# Patient Record
Sex: MALE | Race: WHITE | NOT HISPANIC OR LATINO | URBAN - METROPOLITAN AREA
[De-identification: names, ages, dates, MRNs, and addresses within clinical notes are randomized per-mention and may not be internally consistent; named-entity substitution may affect disease eponyms.]

---

## 2020-04-23 ENCOUNTER — TELEHEALTH PROVIDED OTHER THAN IN PATIENT'S HOME (OUTPATIENT)
Dept: URBAN - METROPOLITAN AREA CLINIC 34 | Facility: CLINIC | Age: 40
End: 2020-04-23

## 2020-04-23 VITALS — WEIGHT: 196 LBS | HEIGHT: 70 IN

## 2020-04-23 DIAGNOSIS — R14.2 ERUCTATION: ICD-10-CM

## 2020-04-23 DIAGNOSIS — R10.10 UPPER ABDOMINAL PAIN, UNSPECIFIED: ICD-10-CM

## 2020-04-23 PROCEDURE — 99244 OFF/OP CNSLTJ NEW/EST MOD 40: CPT | Mod: 95

## 2020-04-23 RX ORDER — PANTOPRAZOLE SODIUM 40 MG/1
TABLET, DELAYED RELEASE ORAL
Qty: 30 | Refills: 5 | Status: COMPLETED
Start: 2020-04-23 | End: 2020-05-06

## 2020-04-23 NOTE — SERVICEHPINOTES
PATIENT VERIFIED BY DATE OF BIRTH AND NAME. Patient has been consented for this telecommunication visit. Vipul is in the [employer] and was [place]. Symptoms began in November while there and has continued since. States his symptoms started after eating local food. When symptoms first began was having abd distention and a lot of belching. Never had heartburn.  Had darker stool twice in December but none since. Since then, after eating acidic or greasy foods, eats bloated and a lot of belching.  Was told to try omeprazole and pepto bismol which caused stomach pain when he first started. Takes Carafate a few times per day since beginning of March, "maybe" it helps. Took lansoprazole x 3 weeks but stopped as it caused anxiety but did help. Felt dizzy with omeprazole. Has Pepcid and has taken prn. had h pylori serology which was reportedly negative. Denies dysphagia. Occasional nausea no vomiting. Has been avoiding NSAIDs since sx began--prior to sx onset was taking ibuprofen daily for a few weeks for hamstring tear (was taking 1600mg per day). Now takes Tylenol prn.  Avoiding alcohol nonsmoker. Drinking 1 cup of coffee daily. Caffeine does not seem to bother him. No family hx of GI cancer. Appetite is good. no weight loss.BRROS per HPI, otherwise negative. BR

## 2020-05-06 ENCOUNTER — OFFICE (OUTPATIENT)
Dept: URBAN - METROPOLITAN AREA PATHOLOGY 18 | Facility: PATHOLOGY | Age: 40
End: 2020-05-06
Payer: COMMERCIAL

## 2020-05-06 ENCOUNTER — OFFICE (OUTPATIENT)
Dept: URBAN - METROPOLITAN AREA CLINIC 30 | Facility: CLINIC | Age: 40
End: 2020-05-06

## 2020-05-06 VITALS
DIASTOLIC BLOOD PRESSURE: 69 MMHG | HEART RATE: 72 BPM | HEART RATE: 74 BPM | HEART RATE: 66 BPM | DIASTOLIC BLOOD PRESSURE: 68 MMHG | WEIGHT: 196 LBS | OXYGEN SATURATION: 97 % | SYSTOLIC BLOOD PRESSURE: 119 MMHG | OXYGEN SATURATION: 99 % | SYSTOLIC BLOOD PRESSURE: 126 MMHG | OXYGEN SATURATION: 100 % | DIASTOLIC BLOOD PRESSURE: 59 MMHG | TEMPERATURE: 97.9 F | RESPIRATION RATE: 16 BRPM | SYSTOLIC BLOOD PRESSURE: 117 MMHG | DIASTOLIC BLOOD PRESSURE: 78 MMHG | DIASTOLIC BLOOD PRESSURE: 60 MMHG | OXYGEN SATURATION: 95 % | SYSTOLIC BLOOD PRESSURE: 100 MMHG | HEIGHT: 70 IN | HEART RATE: 82 BPM | TEMPERATURE: 98.1 F | RESPIRATION RATE: 10 BRPM | SYSTOLIC BLOOD PRESSURE: 103 MMHG | OXYGEN SATURATION: 98 % | RESPIRATION RATE: 18 BRPM | HEART RATE: 86 BPM

## 2020-05-06 DIAGNOSIS — R10.10 UPPER ABDOMINAL PAIN, UNSPECIFIED: ICD-10-CM

## 2020-05-06 DIAGNOSIS — K20.0 EOSINOPHILIC ESOPHAGITIS: ICD-10-CM

## 2020-05-06 DIAGNOSIS — K29.60 OTHER GASTRITIS WITHOUT BLEEDING: ICD-10-CM

## 2020-05-06 DIAGNOSIS — R14.2 ERUCTATION: ICD-10-CM

## 2020-05-06 PROBLEM — K20.9 ESOPHAGITIS, UNSPECIFIED: Status: ACTIVE | Noted: 2020-05-06

## 2020-05-06 PROBLEM — K20.8 OTHER ESOPHAGITIS: Status: ACTIVE | Noted: 2020-05-06

## 2020-05-06 PROBLEM — K31.89 OTHER DISEASES OF STOMACH AND DUODENUM: Status: ACTIVE | Noted: 2020-05-06

## 2020-05-06 PROCEDURE — 88305 TISSUE EXAM BY PATHOLOGIST: CPT | Performed by: PATHOLOGY

## 2020-05-06 PROCEDURE — 88313 SPECIAL STAINS GROUP 2: CPT | Performed by: PATHOLOGY

## 2020-05-06 PROCEDURE — 88312 SPECIAL STAINS GROUP 1: CPT | Performed by: PATHOLOGY

## 2020-05-06 PROCEDURE — 88342 IMHCHEM/IMCYTCHM 1ST ANTB: CPT | Performed by: PATHOLOGY

## 2020-05-06 RX ORDER — ESOMEPRAZOLE MAGNESIUM 40 MG/1
CAPSULE, DELAYED RELEASE ORAL
Qty: 30 | Refills: 5 | Status: COMPLETED
Start: 2020-05-06 | End: 2020-05-06

## 2020-06-05 ENCOUNTER — TELEHEALTH PROVIDED OTHER THAN IN PATIENT'S HOME (OUTPATIENT)
Dept: URBAN - METROPOLITAN AREA TELEHEALTH 3 | Facility: TELEHEALTH | Age: 40
End: 2020-06-05

## 2020-06-05 VITALS — WEIGHT: 200 LBS | HEIGHT: 70 IN

## 2020-06-05 DIAGNOSIS — K21.0 GASTRO-ESOPHAGEAL REFLUX DISEASE WITH ESOPHAGITIS: ICD-10-CM

## 2020-06-05 PROCEDURE — 99214 OFFICE O/P EST MOD 30 MIN: CPT | Mod: 95

## 2020-06-05 NOTE — SERVICEHPINOTES
PATIENT VERIFIED BY DATE OF BIRTH AND NAME. Patient has been consented for this telephone visit. Vipul presents for EGD follow up. EGD done on 5/6 for bloating, belching revealed mild esophagitis and mild gastritis (esophageal biopsies with up to 30 eos/hpf). He could not tolerate any PPIs as he developed anxiety symptoms within a few days of taking, although did help his GI symptoms. However, he has been taking Pepcid and/or tagament 1-2x daily with good results. No longer having any symptoms unless he eats something he shouldn't (spicy foods, etc). Mainly just gets abd distention, heartburn if he eats anything acidic no longer having belching. NO dysphagia. He has avoided NSAIDs since last visit. No further complaints today.BRROS as per HPI, otherwise negative. BR
